# Patient Record
Sex: FEMALE | Race: WHITE | NOT HISPANIC OR LATINO | Employment: OTHER | URBAN - METROPOLITAN AREA
[De-identification: names, ages, dates, MRNs, and addresses within clinical notes are randomized per-mention and may not be internally consistent; named-entity substitution may affect disease eponyms.]

---

## 2017-12-25 ENCOUNTER — APPOINTMENT (OUTPATIENT)
Dept: RADIOLOGY | Facility: MEDICAL CENTER | Age: 47
DRG: 087 | End: 2017-12-25
Attending: SURGERY
Payer: COMMERCIAL

## 2017-12-25 ENCOUNTER — APPOINTMENT (OUTPATIENT)
Dept: RADIOLOGY | Facility: MEDICAL CENTER | Age: 47
DRG: 087 | End: 2017-12-25
Attending: EMERGENCY MEDICINE
Payer: COMMERCIAL

## 2017-12-25 ENCOUNTER — HOSPITAL ENCOUNTER (INPATIENT)
Facility: MEDICAL CENTER | Age: 47
LOS: 2 days | DRG: 087 | End: 2017-12-27
Attending: EMERGENCY MEDICINE | Admitting: SURGERY
Payer: COMMERCIAL

## 2017-12-25 ENCOUNTER — RESOLUTE PROFESSIONAL BILLING HOSPITAL PROF FEE (OUTPATIENT)
Dept: HOSPITALIST | Facility: MEDICAL CENTER | Age: 47
End: 2017-12-25
Payer: COMMERCIAL

## 2017-12-25 ENCOUNTER — HOSPITAL ENCOUNTER (OUTPATIENT)
Dept: RADIOLOGY | Facility: MEDICAL CENTER | Age: 47
End: 2017-12-25

## 2017-12-25 DIAGNOSIS — T14.90XA TRAUMA: ICD-10-CM

## 2017-12-25 DIAGNOSIS — S06.5XAA SUBDURAL HEMATOMA (HCC): ICD-10-CM

## 2017-12-25 DIAGNOSIS — S06.6X0A SUBARACHNOID HEMORRHAGE FOLLOWING INJURY, NO LOSS OF CONSCIOUSNESS, INITIAL ENCOUNTER (HCC): ICD-10-CM

## 2017-12-25 PROBLEM — Z53.09 CONTRAINDICATION TO DEEP VEIN THROMBOSIS (DVT) PROPHYLAXIS: Status: ACTIVE | Noted: 2017-12-25

## 2017-12-25 PROBLEM — S05.01XA ABRASION OF RIGHT CORNEA: Status: ACTIVE | Noted: 2017-12-25

## 2017-12-25 PROBLEM — D69.59 PLATELET DYSFUNCTION DUE TO DRUGS: Status: ACTIVE | Noted: 2017-12-25

## 2017-12-25 PROBLEM — T50.905A PLATELET DYSFUNCTION DUE TO DRUGS: Status: ACTIVE | Noted: 2017-12-25

## 2017-12-25 LAB
ABO GROUP BLD: NORMAL
ALBUMIN SERPL BCP-MCNC: 3.8 G/DL (ref 3.2–4.9)
ALBUMIN/GLOB SERPL: 1.3 G/DL
ALP SERPL-CCNC: 40 U/L (ref 30–99)
ALT SERPL-CCNC: 15 U/L (ref 2–50)
ANION GAP SERPL CALC-SCNC: 9 MMOL/L (ref 0–11.9)
APTT PPP: 26.3 SEC (ref 24.7–36)
AST SERPL-CCNC: 22 U/L (ref 12–45)
BILIRUB SERPL-MCNC: 0.4 MG/DL (ref 0.1–1.5)
BLD GP AB SCN SERPL QL: NORMAL
BUN SERPL-MCNC: 11 MG/DL (ref 8–22)
CALCIUM SERPL-MCNC: 9.6 MG/DL (ref 8.5–10.5)
CFT BLD TEG: 5.2 MIN (ref 5–10)
CHLORIDE SERPL-SCNC: 110 MMOL/L (ref 96–112)
CLOT ANGLE BLD TEG: 66.2 DEGREES (ref 53–72)
CLOT LYSIS 30M P MA LENFR BLD TEG: 0 % (ref 0–8)
CO2 SERPL-SCNC: 23 MMOL/L (ref 20–33)
CREAT SERPL-MCNC: 0.86 MG/DL (ref 0.5–1.4)
CT.EXTRINSIC BLD ROTEM: 1.8 MIN (ref 1–3)
ERYTHROCYTE [DISTWIDTH] IN BLOOD BY AUTOMATED COUNT: 38.4 FL (ref 35.9–50)
ETHANOL BLD-MCNC: 0 G/DL
GFR SERPL CREATININE-BSD FRML MDRD: >60 ML/MIN/1.73 M 2
GLOBULIN SER CALC-MCNC: 2.9 G/DL (ref 1.9–3.5)
GLUCOSE SERPL-MCNC: 83 MG/DL (ref 65–99)
HCG SERPL QL: NEGATIVE
HCT VFR BLD AUTO: 39.1 % (ref 37–47)
HGB BLD-MCNC: 13.3 G/DL (ref 12–16)
INR PPP: 1.19 (ref 0.87–1.13)
MCF BLD TEG: 66 MM (ref 50–70)
MCH RBC QN AUTO: 31 PG (ref 27–33)
MCHC RBC AUTO-ENTMCNC: 34 G/DL (ref 33.6–35)
MCV RBC AUTO: 91.1 FL (ref 81.4–97.8)
PA AA BLD-ACNC: 83 %
PA ADP BLD-ACNC: 13.4 %
PLATELET # BLD AUTO: 195 K/UL (ref 164–446)
PMV BLD AUTO: 10.9 FL (ref 9–12.9)
POTASSIUM SERPL-SCNC: 3.8 MMOL/L (ref 3.6–5.5)
PROT SERPL-MCNC: 6.7 G/DL (ref 6–8.2)
PROTHROMBIN TIME: 14.8 SEC (ref 12–14.6)
RBC # BLD AUTO: 4.29 M/UL (ref 4.2–5.4)
RH BLD: NORMAL
SODIUM SERPL-SCNC: 142 MMOL/L (ref 135–145)
TEG ALGORITHM TGALG: NORMAL
WBC # BLD AUTO: 7.9 K/UL (ref 4.8–10.8)

## 2017-12-25 PROCEDURE — 86900 BLOOD TYPING SEROLOGIC ABO: CPT

## 2017-12-25 PROCEDURE — 85576 BLOOD PLATELET AGGREGATION: CPT

## 2017-12-25 PROCEDURE — 86901 BLOOD TYPING SEROLOGIC RH(D): CPT

## 2017-12-25 PROCEDURE — 85610 PROTHROMBIN TIME: CPT

## 2017-12-25 PROCEDURE — 90471 IMMUNIZATION ADMIN: CPT

## 2017-12-25 PROCEDURE — A9270 NON-COVERED ITEM OR SERVICE: HCPCS | Performed by: SURGERY

## 2017-12-25 PROCEDURE — 3E0234Z INTRODUCTION OF SERUM, TOXOID AND VACCINE INTO MUSCLE, PERCUTANEOUS APPROACH: ICD-10-PCS | Performed by: SURGERY

## 2017-12-25 PROCEDURE — G0390 TRAUMA RESPONS W/HOSP CRITI: HCPCS

## 2017-12-25 PROCEDURE — 90686 IIV4 VACC NO PRSV 0.5 ML IM: CPT | Performed by: SURGERY

## 2017-12-25 PROCEDURE — 85730 THROMBOPLASTIN TIME PARTIAL: CPT

## 2017-12-25 PROCEDURE — 700101 HCHG RX REV CODE 250: Performed by: SURGERY

## 2017-12-25 PROCEDURE — 700105 HCHG RX REV CODE 258: Performed by: SURGERY

## 2017-12-25 PROCEDURE — 70450 CT HEAD/BRAIN W/O DYE: CPT

## 2017-12-25 PROCEDURE — 85384 FIBRINOGEN ACTIVITY: CPT

## 2017-12-25 PROCEDURE — 84703 CHORIONIC GONADOTROPIN ASSAY: CPT

## 2017-12-25 PROCEDURE — 85347 COAGULATION TIME ACTIVATED: CPT

## 2017-12-25 PROCEDURE — 99291 CRITICAL CARE FIRST HOUR: CPT

## 2017-12-25 PROCEDURE — 700102 HCHG RX REV CODE 250 W/ 637 OVERRIDE(OP): Performed by: SURGERY

## 2017-12-25 PROCEDURE — 86850 RBC ANTIBODY SCREEN: CPT

## 2017-12-25 PROCEDURE — 770022 HCHG ROOM/CARE - ICU (200)

## 2017-12-25 PROCEDURE — 85027 COMPLETE CBC AUTOMATED: CPT

## 2017-12-25 PROCEDURE — 80053 COMPREHEN METABOLIC PANEL: CPT

## 2017-12-25 PROCEDURE — 700111 HCHG RX REV CODE 636 W/ 250 OVERRIDE (IP): Performed by: SURGERY

## 2017-12-25 PROCEDURE — 80307 DRUG TEST PRSMV CHEM ANLYZR: CPT

## 2017-12-25 RX ORDER — POLYETHYLENE GLYCOL 3350 17 G/17G
1 POWDER, FOR SOLUTION ORAL 2 TIMES DAILY
Status: DISCONTINUED | OUTPATIENT
Start: 2017-12-25 | End: 2017-12-27 | Stop reason: HOSPADM

## 2017-12-25 RX ORDER — OXYCODONE HYDROCHLORIDE 5 MG/1
5 TABLET ORAL
Status: DISCONTINUED | OUTPATIENT
Start: 2017-12-25 | End: 2017-12-27

## 2017-12-25 RX ORDER — FAMOTIDINE 20 MG/1
20 TABLET, FILM COATED ORAL 2 TIMES DAILY
Status: DISCONTINUED | OUTPATIENT
Start: 2017-12-25 | End: 2017-12-26

## 2017-12-25 RX ORDER — DOCUSATE SODIUM 100 MG/1
100 CAPSULE, LIQUID FILLED ORAL 2 TIMES DAILY
Status: DISCONTINUED | OUTPATIENT
Start: 2017-12-25 | End: 2017-12-27 | Stop reason: HOSPADM

## 2017-12-25 RX ORDER — ACETAMINOPHEN 500 MG
1000 TABLET ORAL EVERY 6 HOURS
Status: DISCONTINUED | OUTPATIENT
Start: 2017-12-26 | End: 2017-12-27 | Stop reason: HOSPADM

## 2017-12-25 RX ORDER — ENEMA 19; 7 G/133ML; G/133ML
1 ENEMA RECTAL
Status: DISCONTINUED | OUTPATIENT
Start: 2017-12-25 | End: 2017-12-27 | Stop reason: HOSPADM

## 2017-12-25 RX ORDER — BISACODYL 10 MG
10 SUPPOSITORY, RECTAL RECTAL
Status: DISCONTINUED | OUTPATIENT
Start: 2017-12-25 | End: 2017-12-27 | Stop reason: HOSPADM

## 2017-12-25 RX ORDER — TOBRAMYCIN 3 MG/ML
2 SOLUTION/ DROPS OPHTHALMIC EVERY 4 HOURS
Status: DISCONTINUED | OUTPATIENT
Start: 2017-12-25 | End: 2017-12-27 | Stop reason: HOSPADM

## 2017-12-25 RX ORDER — ONDANSETRON 2 MG/ML
4 INJECTION INTRAMUSCULAR; INTRAVENOUS EVERY 4 HOURS PRN
Status: DISCONTINUED | OUTPATIENT
Start: 2017-12-25 | End: 2017-12-27 | Stop reason: HOSPADM

## 2017-12-25 RX ORDER — AMOXICILLIN 250 MG
1 CAPSULE ORAL
Status: DISCONTINUED | OUTPATIENT
Start: 2017-12-25 | End: 2017-12-27 | Stop reason: HOSPADM

## 2017-12-25 RX ORDER — AMOXICILLIN 250 MG
1 CAPSULE ORAL NIGHTLY
Status: DISCONTINUED | OUTPATIENT
Start: 2017-12-25 | End: 2017-12-27 | Stop reason: HOSPADM

## 2017-12-25 RX ADMIN — FENTANYL CITRATE 50 MCG: 50 INJECTION INTRAMUSCULAR; INTRAVENOUS at 22:56

## 2017-12-25 RX ADMIN — STANDARDIZED SENNA CONCENTRATE AND DOCUSATE SODIUM 1 TABLET: 8.6; 5 TABLET, FILM COATED ORAL at 21:40

## 2017-12-25 RX ADMIN — ONDANSETRON 4 MG: 2 INJECTION INTRAMUSCULAR; INTRAVENOUS at 21:44

## 2017-12-25 RX ADMIN — TOBRAMYCIN 2 DROP: 3 SOLUTION/ DROPS OPHTHALMIC at 21:38

## 2017-12-25 RX ADMIN — FAMOTIDINE 20 MG: 20 TABLET, FILM COATED ORAL at 21:39

## 2017-12-25 RX ADMIN — FENTANYL CITRATE 50 MCG: 50 INJECTION INTRAMUSCULAR; INTRAVENOUS at 20:32

## 2017-12-25 RX ADMIN — INFLUENZA A VIRUS A/MICHIGAN/45/2015 X-275 (H1N1) ANTIGEN (FORMALDEHYDE INACTIVATED), INFLUENZA A VIRUS A/HONG KONG/4801/2014 X-263B (H3N2) ANTIGEN (FORMALDEHYDE INACTIVATED), INFLUENZA B VIRUS B/PHUKET/3073/2013 ANTIGEN (FORMALDEHYDE INACTIVATED), AND INFLUENZA B VIRUS B/BRISBANE/60/2008 ANTIGEN (FORMALDEHYDE INACTIVATED) 0.5 ML: 15; 15; 15; 15 INJECTION, SUSPENSION INTRAMUSCULAR at 21:55

## 2017-12-25 RX ADMIN — DEXTROSE MONOHYDRATE 500 MG: 50 INJECTION, SOLUTION INTRAVENOUS at 21:38

## 2017-12-25 RX ADMIN — OXYCODONE HYDROCHLORIDE 5 MG: 5 TABLET ORAL at 21:38

## 2017-12-25 ASSESSMENT — LIFESTYLE VARIABLES
ALCOHOL_USE: NO
EVER_SMOKED: NEVER

## 2017-12-25 ASSESSMENT — PAIN SCALES - GENERAL
PAINLEVEL_OUTOF10: 8
PAINLEVEL_OUTOF10: 6
PAINLEVEL_OUTOF10: 8
PAINLEVEL_OUTOF10: 8
PAINLEVEL_OUTOF10: 7

## 2017-12-25 ASSESSMENT — PATIENT HEALTH QUESTIONNAIRE - PHQ9
1. LITTLE INTEREST OR PLEASURE IN DOING THINGS: NOT AT ALL
SUM OF ALL RESPONSES TO PHQ9 QUESTIONS 1 AND 2: 0
SUM OF ALL RESPONSES TO PHQ QUESTIONS 1-9: 0
2. FEELING DOWN, DEPRESSED, IRRITABLE, OR HOPELESS: NOT AT ALL

## 2017-12-26 ENCOUNTER — APPOINTMENT (OUTPATIENT)
Dept: RADIOLOGY | Facility: MEDICAL CENTER | Age: 47
DRG: 087 | End: 2017-12-26
Attending: SURGERY
Payer: COMMERCIAL

## 2017-12-26 LAB
ABO GROUP BLD: NORMAL
ALBUMIN SERPL BCP-MCNC: 3.6 G/DL (ref 3.2–4.9)
ALBUMIN/GLOB SERPL: 1.5 G/DL
ALP SERPL-CCNC: 36 U/L (ref 30–99)
ALT SERPL-CCNC: 13 U/L (ref 2–50)
ANION GAP SERPL CALC-SCNC: 5 MMOL/L (ref 0–11.9)
AST SERPL-CCNC: 18 U/L (ref 12–45)
BASOPHILS # BLD AUTO: 1.2 % (ref 0–1.8)
BASOPHILS # BLD: 0.06 K/UL (ref 0–0.12)
BILIRUB SERPL-MCNC: 0.5 MG/DL (ref 0.1–1.5)
BUN SERPL-MCNC: 10 MG/DL (ref 8–22)
CALCIUM SERPL-MCNC: 9 MG/DL (ref 8.5–10.5)
CHLORIDE SERPL-SCNC: 108 MMOL/L (ref 96–112)
CO2 SERPL-SCNC: 26 MMOL/L (ref 20–33)
CREAT SERPL-MCNC: 0.87 MG/DL (ref 0.5–1.4)
EOSINOPHIL # BLD AUTO: 0.15 K/UL (ref 0–0.51)
EOSINOPHIL NFR BLD: 3 % (ref 0–6.9)
ERYTHROCYTE [DISTWIDTH] IN BLOOD BY AUTOMATED COUNT: 40.4 FL (ref 35.9–50)
GFR SERPL CREATININE-BSD FRML MDRD: >60 ML/MIN/1.73 M 2
GLOBULIN SER CALC-MCNC: 2.4 G/DL (ref 1.9–3.5)
GLUCOSE SERPL-MCNC: 83 MG/DL (ref 65–99)
HCT VFR BLD AUTO: 37.1 % (ref 37–47)
HGB BLD-MCNC: 12.4 G/DL (ref 12–16)
IMM GRANULOCYTES # BLD AUTO: 0.01 K/UL (ref 0–0.11)
IMM GRANULOCYTES NFR BLD AUTO: 0.2 % (ref 0–0.9)
LYMPHOCYTES # BLD AUTO: 1.69 K/UL (ref 1–4.8)
LYMPHOCYTES NFR BLD: 34.2 % (ref 22–41)
MCH RBC QN AUTO: 30.8 PG (ref 27–33)
MCHC RBC AUTO-ENTMCNC: 33.4 G/DL (ref 33.6–35)
MCV RBC AUTO: 92.1 FL (ref 81.4–97.8)
MONOCYTES # BLD AUTO: 0.41 K/UL (ref 0–0.85)
MONOCYTES NFR BLD AUTO: 8.3 % (ref 0–13.4)
NEUTROPHILS # BLD AUTO: 2.62 K/UL (ref 2–7.15)
NEUTROPHILS NFR BLD: 53.1 % (ref 44–72)
NRBC # BLD AUTO: 0 K/UL
NRBC BLD-RTO: 0 /100 WBC
PLATELET # BLD AUTO: 153 K/UL (ref 164–446)
PMV BLD AUTO: 10.9 FL (ref 9–12.9)
POTASSIUM SERPL-SCNC: 4 MMOL/L (ref 3.6–5.5)
PROT SERPL-MCNC: 6 G/DL (ref 6–8.2)
RBC # BLD AUTO: 4.03 M/UL (ref 4.2–5.4)
SODIUM SERPL-SCNC: 139 MMOL/L (ref 135–145)
WBC # BLD AUTO: 4.9 K/UL (ref 4.8–10.8)

## 2017-12-26 PROCEDURE — 302196 LINEN, HYPOALLERGENIC: Performed by: SURGERY

## 2017-12-26 PROCEDURE — 700105 HCHG RX REV CODE 258: Performed by: SURGERY

## 2017-12-26 PROCEDURE — 700111 HCHG RX REV CODE 636 W/ 250 OVERRIDE (IP): Performed by: SURGERY

## 2017-12-26 PROCEDURE — 80053 COMPREHEN METABOLIC PANEL: CPT

## 2017-12-26 PROCEDURE — 700111 HCHG RX REV CODE 636 W/ 250 OVERRIDE (IP): Performed by: NURSE PRACTITIONER

## 2017-12-26 PROCEDURE — 92523 SPEECH SOUND LANG COMPREHEN: CPT

## 2017-12-26 PROCEDURE — 85025 COMPLETE CBC W/AUTO DIFF WBC: CPT

## 2017-12-26 PROCEDURE — G9166 ATTEN GOAL STATUS: HCPCS | Mod: CI

## 2017-12-26 PROCEDURE — 700102 HCHG RX REV CODE 250 W/ 637 OVERRIDE(OP): Performed by: SURGERY

## 2017-12-26 PROCEDURE — G9167 ATTEN D/C STATUS: HCPCS | Mod: CI

## 2017-12-26 PROCEDURE — 99233 SBSQ HOSP IP/OBS HIGH 50: CPT | Performed by: SURGERY

## 2017-12-26 PROCEDURE — 700112 HCHG RX REV CODE 229: Performed by: SURGERY

## 2017-12-26 PROCEDURE — G9165 ATTEN CURRENT STATUS: HCPCS | Mod: CI

## 2017-12-26 PROCEDURE — 70450 CT HEAD/BRAIN W/O DYE: CPT

## 2017-12-26 PROCEDURE — A9270 NON-COVERED ITEM OR SERVICE: HCPCS | Performed by: SURGERY

## 2017-12-26 PROCEDURE — 700101 HCHG RX REV CODE 250: Performed by: SURGERY

## 2017-12-26 PROCEDURE — 770006 HCHG ROOM/CARE - MED/SURG/GYN SEMI*

## 2017-12-26 RX ORDER — DIAZEPAM 2 MG/1
2 TABLET ORAL EVERY 6 HOURS PRN
Status: DISCONTINUED | OUTPATIENT
Start: 2017-12-26 | End: 2017-12-27 | Stop reason: HOSPADM

## 2017-12-26 RX ADMIN — TOBRAMYCIN 2 DROP: 3 SOLUTION/ DROPS OPHTHALMIC at 05:05

## 2017-12-26 RX ADMIN — FAMOTIDINE 20 MG: 20 TABLET, FILM COATED ORAL at 08:34

## 2017-12-26 RX ADMIN — STANDARDIZED SENNA CONCENTRATE AND DOCUSATE SODIUM 1 TABLET: 8.6; 5 TABLET, FILM COATED ORAL at 20:12

## 2017-12-26 RX ADMIN — FENTANYL CITRATE 50 MCG: 50 INJECTION INTRAMUSCULAR; INTRAVENOUS at 01:20

## 2017-12-26 RX ADMIN — TOBRAMYCIN 2 DROP: 3 SOLUTION/ DROPS OPHTHALMIC at 20:57

## 2017-12-26 RX ADMIN — ACETAMINOPHEN 1000 MG: 500 TABLET ORAL at 23:54

## 2017-12-26 RX ADMIN — DOCUSATE SODIUM 100 MG: 100 CAPSULE ORAL at 08:34

## 2017-12-26 RX ADMIN — ACETAMINOPHEN 1000 MG: 500 TABLET ORAL at 01:20

## 2017-12-26 RX ADMIN — TOBRAMYCIN 2 DROP: 3 SOLUTION/ DROPS OPHTHALMIC at 01:22

## 2017-12-26 RX ADMIN — OXYCODONE HYDROCHLORIDE 5 MG: 5 TABLET ORAL at 08:35

## 2017-12-26 RX ADMIN — FENTANYL CITRATE 50 MCG: 50 INJECTION INTRAMUSCULAR; INTRAVENOUS at 14:40

## 2017-12-26 RX ADMIN — TOBRAMYCIN 2 DROP: 3 SOLUTION/ DROPS OPHTHALMIC at 14:00

## 2017-12-26 RX ADMIN — FENTANYL CITRATE 50 MCG: 50 INJECTION INTRAMUSCULAR; INTRAVENOUS at 08:34

## 2017-12-26 RX ADMIN — DOCUSATE SODIUM 100 MG: 100 CAPSULE ORAL at 20:13

## 2017-12-26 RX ADMIN — FENTANYL CITRATE 50 MCG: 50 INJECTION INTRAMUSCULAR; INTRAVENOUS at 11:59

## 2017-12-26 RX ADMIN — OXYCODONE HYDROCHLORIDE 5 MG: 5 TABLET ORAL at 20:13

## 2017-12-26 RX ADMIN — OXYCODONE HYDROCHLORIDE 5 MG: 5 TABLET ORAL at 23:54

## 2017-12-26 RX ADMIN — OXYCODONE HYDROCHLORIDE 5 MG: 5 TABLET ORAL at 14:39

## 2017-12-26 RX ADMIN — ACETAMINOPHEN 1000 MG: 500 TABLET ORAL at 05:05

## 2017-12-26 RX ADMIN — TOBRAMYCIN 2 DROP: 3 SOLUTION/ DROPS OPHTHALMIC at 10:00

## 2017-12-26 RX ADMIN — OXYCODONE HYDROCHLORIDE 5 MG: 5 TABLET ORAL at 11:59

## 2017-12-26 RX ADMIN — FENTANYL CITRATE 25 MCG: 50 INJECTION, SOLUTION INTRAMUSCULAR; INTRAVENOUS at 20:31

## 2017-12-26 RX ADMIN — DEXTROSE MONOHYDRATE 500 MG: 50 INJECTION, SOLUTION INTRAVENOUS at 20:31

## 2017-12-26 ASSESSMENT — COPD QUESTIONNAIRES
HAVE YOU SMOKED AT LEAST 100 CIGARETTES IN YOUR ENTIRE LIFE: NO/DON'T KNOW
DURING THE PAST 4 WEEKS HOW MUCH DID YOU FEEL SHORT OF BREATH: NONE/LITTLE OF THE TIME
COPD SCREENING SCORE: 0
DO YOU EVER COUGH UP ANY MUCUS OR PHLEGM?: NO/ONLY WITH OCCASIONAL COLDS OR INFECTIONS

## 2017-12-26 ASSESSMENT — PAIN SCALES - GENERAL
PAINLEVEL_OUTOF10: 4
PAINLEVEL_OUTOF10: 2
PAINLEVEL_OUTOF10: 8
PAINLEVEL_OUTOF10: 7
PAINLEVEL_OUTOF10: 7
PAINLEVEL_OUTOF10: 8
PAINLEVEL_OUTOF10: 5
PAINLEVEL_OUTOF10: 8
PAINLEVEL_OUTOF10: 6
PAINLEVEL_OUTOF10: 4

## 2017-12-26 ASSESSMENT — PATIENT HEALTH QUESTIONNAIRE - PHQ9
SUM OF ALL RESPONSES TO PHQ QUESTIONS 1-9: 0
1. LITTLE INTEREST OR PLEASURE IN DOING THINGS: NOT AT ALL
SUM OF ALL RESPONSES TO PHQ9 QUESTIONS 1 AND 2: 0
2. FEELING DOWN, DEPRESSED, IRRITABLE, OR HOPELESS: NOT AT ALL

## 2017-12-26 ASSESSMENT — ENCOUNTER SYMPTOMS
ABDOMINAL PAIN: 0
NECK PAIN: 1
NAUSEA: 0
BLURRED VISION: 1
HEADACHES: 1
FEVER: 0
CHILLS: 0
FOCAL WEAKNESS: 0
BACK PAIN: 1
SHORTNESS OF BREATH: 0
SENSORY CHANGE: 0
MYALGIAS: 1
SPEECH CHANGE: 0
VOMITING: 0

## 2017-12-26 ASSESSMENT — LIFESTYLE VARIABLES: EVER_SMOKED: NEVER

## 2017-12-26 NOTE — CARE PLAN
Problem: Infection  Goal: Will remain free from infection  Outcome: PROGRESSING AS EXPECTED  Hand washing promoted.
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
## 2017-12-26 NOTE — THERAPY
"Speech Language Therapy Evaluation completed to address cognition.    Functional Status: Pt AAOx4 with daughter and sister beside, and engaging in complex conversation.  Family reporting pt appears to be WNL cognitively.  Pt reporting severe pain and \"emotional stress\", however does not feel reporting confusion or memory loss.  Cognitive testing revealed pt has minimal deficits with attention, however she is aware of this and self corrects and requests repetition of information.  Suspect pain and medication could be contributing to mild attention deficits.  Pt has a patch on her right eye 2/2 corneal abrasion, and was without her glasses.  She also had minimal deficits following complex written directions, however again was able to self correct.  All other domains were within functional limits.  She had good short term memory and good verbal problem solving skills.  Pt was educated regarding s/sx of TBI/post-concussion syndrome and possible s/sx to be aware of post discharge.      Recommendations/Plan of Care: 1) Patient with no further skilled SLP needs in the acute care setting at this time, 2) If she finds that return to her regular routine is difficult, she may benefit from a post acute SLP evaluation    Post-Acute Therapy: Currently anticipate no further skilled therapy needs once patient is discharged from the inpatient setting.    See \"Rehab Therapy-Acute\" Patient Summary Report for complete documentation.  Thank you for the consult.  "
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
## 2017-12-26 NOTE — CARE PLAN
Problem: Safety  Goal: Will remain free from injury  Outcome: PROGRESSING AS EXPECTED  Educated pt about necessity of remaining in bed and reoriented frequently to environment and situation. Side rails raised x2, bed in lowest position, bed alarm functioning and in use, call light within reach. Pt free from falls/injury.    Problem: Pain Management  Goal: Pain level will decrease to patient's comfort goal  Outcome: PROGRESSING AS EXPECTED  Pt c/o headache and R eye pain. Medicated per MAR, provided education & emotional support, quiet area, decreased lighting, and nutrition. Pt able to rest.

## 2017-12-26 NOTE — CARE PLAN
Problem: Safety  Goal: Will remain free from falls  Outcome: PROGRESSING AS EXPECTED  Call light in reach, bed alarm set, oriented to call when needing to get out of bed.

## 2017-12-26 NOTE — PROGRESS NOTES
Assumed care of pt @ 1955.  Bedside report received from CASTILLO Johnson.  Pt transferred from [place] [id number] to [facility] with monitor in use and ambu bag at bedside.
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
## 2017-12-27 VITALS
SYSTOLIC BLOOD PRESSURE: 104 MMHG | WEIGHT: 103.4 LBS | RESPIRATION RATE: 16 BRPM | DIASTOLIC BLOOD PRESSURE: 69 MMHG | OXYGEN SATURATION: 95 % | HEIGHT: 64 IN | HEART RATE: 73 BPM | BODY MASS INDEX: 17.65 KG/M2 | TEMPERATURE: 97 F

## 2017-12-27 LAB
BASOPHILS # BLD AUTO: 1.6 % (ref 0–1.8)
BASOPHILS # BLD: 0.06 K/UL (ref 0–0.12)
EOSINOPHIL # BLD AUTO: 0.29 K/UL (ref 0–0.51)
EOSINOPHIL NFR BLD: 7.7 % (ref 0–6.9)
ERYTHROCYTE [DISTWIDTH] IN BLOOD BY AUTOMATED COUNT: 39.7 FL (ref 35.9–50)
HCT VFR BLD AUTO: 36.7 % (ref 37–47)
HGB BLD-MCNC: 12.2 G/DL (ref 12–16)
IMM GRANULOCYTES # BLD AUTO: 0 K/UL (ref 0–0.11)
IMM GRANULOCYTES NFR BLD AUTO: 0 % (ref 0–0.9)
LYMPHOCYTES # BLD AUTO: 1.71 K/UL (ref 1–4.8)
LYMPHOCYTES NFR BLD: 45.2 % (ref 22–41)
MCH RBC QN AUTO: 31 PG (ref 27–33)
MCHC RBC AUTO-ENTMCNC: 33.2 G/DL (ref 33.6–35)
MCV RBC AUTO: 93.1 FL (ref 81.4–97.8)
MONOCYTES # BLD AUTO: 0.32 K/UL (ref 0–0.85)
MONOCYTES NFR BLD AUTO: 8.5 % (ref 0–13.4)
NEUTROPHILS # BLD AUTO: 1.4 K/UL (ref 2–7.15)
NEUTROPHILS NFR BLD: 37 % (ref 44–72)
NRBC # BLD AUTO: 0 K/UL
NRBC BLD-RTO: 0 /100 WBC
PLATELET # BLD AUTO: 181 K/UL (ref 164–446)
PMV BLD AUTO: 11.4 FL (ref 9–12.9)
RBC # BLD AUTO: 3.94 M/UL (ref 4.2–5.4)
WBC # BLD AUTO: 3.8 K/UL (ref 4.8–10.8)

## 2017-12-27 PROCEDURE — 700102 HCHG RX REV CODE 250 W/ 637 OVERRIDE(OP): Performed by: SURGERY

## 2017-12-27 PROCEDURE — A9270 NON-COVERED ITEM OR SERVICE: HCPCS | Performed by: SURGERY

## 2017-12-27 PROCEDURE — G8987 SELF CARE CURRENT STATUS: HCPCS | Mod: CI

## 2017-12-27 PROCEDURE — A9270 NON-COVERED ITEM OR SERVICE: HCPCS | Performed by: NURSE PRACTITIONER

## 2017-12-27 PROCEDURE — 700102 HCHG RX REV CODE 250 W/ 637 OVERRIDE(OP): Performed by: NURSE PRACTITIONER

## 2017-12-27 PROCEDURE — 97535 SELF CARE MNGMENT TRAINING: CPT

## 2017-12-27 PROCEDURE — G8979 MOBILITY GOAL STATUS: HCPCS | Mod: CI

## 2017-12-27 PROCEDURE — 97165 OT EVAL LOW COMPLEX 30 MIN: CPT

## 2017-12-27 PROCEDURE — 700112 HCHG RX REV CODE 229: Performed by: SURGERY

## 2017-12-27 PROCEDURE — 700111 HCHG RX REV CODE 636 W/ 250 OVERRIDE (IP): Performed by: NURSE PRACTITIONER

## 2017-12-27 PROCEDURE — 85025 COMPLETE CBC W/AUTO DIFF WBC: CPT

## 2017-12-27 PROCEDURE — 700111 HCHG RX REV CODE 636 W/ 250 OVERRIDE (IP): Performed by: SURGERY

## 2017-12-27 PROCEDURE — G8988 SELF CARE GOAL STATUS: HCPCS | Mod: CI

## 2017-12-27 PROCEDURE — 700105 HCHG RX REV CODE 258: Performed by: SURGERY

## 2017-12-27 PROCEDURE — 36415 COLL VENOUS BLD VENIPUNCTURE: CPT

## 2017-12-27 PROCEDURE — 97161 PT EVAL LOW COMPLEX 20 MIN: CPT

## 2017-12-27 PROCEDURE — G8978 MOBILITY CURRENT STATUS: HCPCS | Mod: CJ

## 2017-12-27 RX ORDER — OXYCODONE HYDROCHLORIDE 10 MG/1
10 TABLET ORAL
Qty: 28 TAB | Refills: 0 | Status: SHIPPED | OUTPATIENT
Start: 2017-12-27 | End: 2018-01-03

## 2017-12-27 RX ORDER — TOBRAMYCIN 3 MG/ML
2 SOLUTION/ DROPS OPHTHALMIC EVERY 4 HOURS
Qty: 3 ML | Refills: 0 | Status: SHIPPED | OUTPATIENT
Start: 2017-12-27 | End: 2018-01-01

## 2017-12-27 RX ORDER — OXYCODONE HYDROCHLORIDE 10 MG/1
10 TABLET ORAL
Status: DISCONTINUED | OUTPATIENT
Start: 2017-12-27 | End: 2017-12-27 | Stop reason: HOSPADM

## 2017-12-27 RX ORDER — ONDANSETRON 4 MG/1
4 TABLET, ORALLY DISINTEGRATING ORAL EVERY 4 HOURS PRN
Status: DISCONTINUED | OUTPATIENT
Start: 2017-12-27 | End: 2017-12-27 | Stop reason: HOSPADM

## 2017-12-27 RX ORDER — LEVETIRACETAM 500 MG/1
500 TABLET ORAL 2 TIMES DAILY
Qty: 6 TAB | Refills: 0 | Status: SHIPPED | OUTPATIENT
Start: 2017-12-27 | End: 2017-12-30

## 2017-12-27 RX ORDER — DIAZEPAM 2 MG/1
1 TABLET ORAL EVERY 6 HOURS PRN
Qty: 15 TAB | Refills: 0 | Status: SHIPPED | OUTPATIENT
Start: 2017-12-27 | End: 2018-01-03

## 2017-12-27 RX ADMIN — DOCUSATE SODIUM 100 MG: 100 CAPSULE ORAL at 07:58

## 2017-12-27 RX ADMIN — OXYCODONE HYDROCHLORIDE 10 MG: 10 TABLET ORAL at 13:42

## 2017-12-27 RX ADMIN — TOBRAMYCIN 2 DROP: 3 SOLUTION/ DROPS OPHTHALMIC at 10:00

## 2017-12-27 RX ADMIN — ONDANSETRON 4 MG: 4 TABLET, ORALLY DISINTEGRATING ORAL at 14:18

## 2017-12-27 RX ADMIN — OXYCODONE HYDROCHLORIDE 5 MG: 5 TABLET ORAL at 07:58

## 2017-12-27 RX ADMIN — MAGNESIUM HYDROXIDE 30 ML: 400 SUSPENSION ORAL at 07:58

## 2017-12-27 RX ADMIN — DEXTROSE MONOHYDRATE 500 MG: 50 INJECTION, SOLUTION INTRAVENOUS at 07:58

## 2017-12-27 RX ADMIN — DIAZEPAM 2 MG: 2 TABLET ORAL at 04:00

## 2017-12-27 RX ADMIN — OXYCODONE HYDROCHLORIDE 10 MG: 10 TABLET ORAL at 10:34

## 2017-12-27 RX ADMIN — ONDANSETRON 4 MG: 2 INJECTION INTRAMUSCULAR; INTRAVENOUS at 12:29

## 2017-12-27 RX ADMIN — ACETAMINOPHEN 1000 MG: 500 TABLET ORAL at 12:10

## 2017-12-27 RX ADMIN — OXYCODONE HYDROCHLORIDE 5 MG: 5 TABLET ORAL at 04:00

## 2017-12-27 RX ADMIN — TOBRAMYCIN 2 DROP: 3 SOLUTION/ DROPS OPHTHALMIC at 04:04

## 2017-12-27 ASSESSMENT — COGNITIVE AND FUNCTIONAL STATUS - GENERAL
WALKING IN HOSPITAL ROOM: A LITTLE
STANDING UP FROM CHAIR USING ARMS: A LITTLE
SUGGESTED CMS G CODE MODIFIER MOBILITY: CJ
CLIMB 3 TO 5 STEPS WITH RAILING: A LITTLE
HELP NEEDED FOR BATHING: A LITTLE
SUGGESTED CMS G CODE MODIFIER DAILY ACTIVITY: CI
MOVING FROM LYING ON BACK TO SITTING ON SIDE OF FLAT BED: A LITTLE
MOBILITY SCORE: 20
DAILY ACTIVITIY SCORE: 23

## 2017-12-27 ASSESSMENT — PAIN SCALES - GENERAL
PAINLEVEL_OUTOF10: 4
PAINLEVEL_OUTOF10: 7
PAINLEVEL_OUTOF10: ASSUMED PAIN PRESENT
PAINLEVEL_OUTOF10: 0
PAINLEVEL_OUTOF10: 6

## 2017-12-27 ASSESSMENT — ACTIVITIES OF DAILY LIVING (ADL): TOILETING: INDEPENDENT

## 2017-12-27 ASSESSMENT — GAIT ASSESSMENTS
DISTANCE (FEET): 150
ASSISTIVE DEVICE: FRONT WHEEL WALKER
DEVIATION: BRADYKINETIC;SHUFFLED GAIT
GAIT LEVEL OF ASSIST: STAND BY ASSIST

## 2017-12-27 NOTE — CARE PLAN
Problem: Communication  Goal: The ability to communicate needs accurately and effectively will improve    Intervention: [place] patient and significant other/support system to call light to alert staff of needs  Able to make needs known, instructed to call for assistance      Problem: Pain Management  Goal: Pain level will decrease to patient's comfort goal    Intervention: Follow pain managment plan developed in collaboration with patient and Interdisciplinary Team  Complains of right sided pain, increased Oxy to 10mg, working on pain control

## 2017-12-27 NOTE — CARE PLAN
Problem: Pain Management  Goal: Pain level will decrease to patient's comfort goal  Outcome: PROGRESSING AS EXPECTED  Patient requesting oxycodone as well as IV fentanyl for pain control. Tylenol given on schedule every 6 hours. Also using hot pack for neck/shoulder/back muscle soreness.    Problem: Mobility  Goal: Risk for activity intolerance will decrease  Outcome: PROGRESSING SLOWER THAN EXPECTED  Patient ambulatory to bathroom and in room with steady gait. Patch over right eye. Does report some dizziness with when standing up.
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
## 2017-12-27 NOTE — PROGRESS NOTES
Assumed care of pt. Able to make needs known. A/ox4. New PIV placed in right forearm 20g, saline locked and flushing well. Regular diet. Up self with some dizziness. Family at bedside. OT recommending FWW for DC. POC home today maybe. Call light in reach, bed in low position, will continue hourly rounding.
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
## 2017-12-27 NOTE — DISCHARGE PLANNING
Medical Social Worker    KI got DME FWW CHOICE for [facility]. KI faxed CHOICE form to [facility].

## 2017-12-27 NOTE — PROGRESS NOTES
Drea Chueng Fall Risk Assessment:     Last Known Fall: No falls  Mobility: Dizziness/generalized weakness  Medications: Cardiovascular or central nervous system meds  Mental Status/LOC/Awareness: Awake, alert, and oriented to date, place, and person  Toileting Needs: No needs  Volume/Electrolyte Status: No problems  Communication/Sensory: Visual (Glasses)/hearing deficit  Behavior: Appropriate behavior  Drea Cheung Fall Risk Total: 5  Fall Risk Level: NO RISK    Universal Fall Precautions:  call light/belongings in reach, bed in low position and locked, wheelchairs and assistive devices out of sight, siderails up x 2, use non-slip footwear, adequate lighting, clutter free and spill free environment, educate on level of risk, educate to call for assistance    Fall Risk Level Interventions:          Patient Specific Interventions:     Medication: review medications with patient and family and limit combination of prn medications  Mental Status/LOC/Awareness: reinforce falls education, check on patient hourly, utilize bed/chair fall alarm and reinforce the use of call light  Toileting: monitor intake and output/use of appropriate interventions, instruct patient/family on the use of grab bars, instruct patient/family on the need to call for assistance when toileting, do not leave patient unattended in bathroom/refer to toileting scripting and toilet prior to giving pain medications  Volume/Electrolyte Status: ensure patient remains hydrated and teach patients to dangle before rising if hypotensive  Communication/Sensory: update plan of care on whiteboard and ensure proper positioning when transferrng/ambulating  Behavioral: administer medication as ordered and instruct/reinforce fall program rationale  Mobility: dangle prior to standing, utilize bed/chair fall alarm, ensure bed is locked and in lowest position and provide appropriate assistive device
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
## 2017-12-27 NOTE — PROGRESS NOTES
Pt given prescriptions and discharge paperwork. PIV removed. All questions answered. Pt taken down by WC to  to wait for ride; waiting with partner.

## 2017-12-27 NOTE — PROGRESS NOTES
Patient pain controlled with Oxycodone given about every 4 hours, Valium given once, Fentanyl given once and scheduled Tylenol. Up to bathroom with standby assist, gait steady, reports dizziness improving. Patch in place to right eye. Reports sleeping well tonight in between care.
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
## 2017-12-27 NOTE — PROGRESS NOTES
Drea Cheung Fall Risk Assessment:     Last Known Fall: No falls  Mobility: Dizziness/generalized weakness  Medications: Cardiovascular or central nervous system meds  Mental Status/LOC/Awareness: Awake, alert, and oriented to date, place, and person  Toileting Needs: No needs  Volume/Electrolyte Status: No problems  Communication/Sensory: Visual (Glasses)/hearing deficit  Behavior: Appropriate behavior  Drea Cheung Fall Risk Total: 5  Fall Risk Level: NO RISK    Universal Fall Precautions:  call light/belongings in reach, bed in low position and locked, wheelchairs and assistive devices out of sight, siderails up x 2, use non-slip footwear, adequate lighting, clutter free and spill free environment, educate on level of risk, educate to call for assistance    Fall Risk Level Interventions:          Patient Specific Interventions:     Medication: review medications with patient and family  Mental Status/LOC/Awareness: reinforce falls education, encourage family to stay with patient and reinforce the use of call light  Toileting: provide frquent toileting  Volume/Electrolyte Status: ensure patient remains hydrated  Communication/Sensory: update plan of care on whiteboard  Behavioral: engage patient in daily activities  Mobility: ensure bed is locked and in lowest position

## 2017-12-28 ENCOUNTER — PATIENT OUTREACH (OUTPATIENT)
Dept: HEALTH INFORMATION MANAGEMENT | Facility: OTHER | Age: 47
End: 2017-12-28

## 2017-12-28 NOTE — PROGRESS NOTES
Placed discharge outreach phone call to patient s/p hospital discharge 12/27/17.  Left voicemail providing my contact information and instructions to call with any questions or concerns.

## 2017-12-29 NOTE — THERAPY
"Occupational Therapy Evaluation completed.   Functional Status:  Initially up in bathroom, friend assisted pt BTB, c/o dizziness, spv w/bed mobility, c/o 8/10 h/a c/o irritability, and feeling overly emotional. Fair strength and normal coordination w/UE, walked to bathroom w/fww balance improved. Spv w/grooming standing, continued c/o pain and fatigue, educated on TBI recovery and modifications w/sensory over load. Spv w/txf to chair remained up w/family present RN aware   Plan of Care: Will benefit from Occupational Therapy 2 times per week  Discharge Recommendations:  Equipment: Will Continue to Assess for Equipment Needs. Post-acute therapy Currently anticipate no further skilled therapy needs once patient is discharged from the inpatient setting.    See \"Rehab Therapy-Acute\" Patient Summary Report for complete documentation.    " [facility]  Pulmonology  Progress Note    Patient Name: Yen Falcon  MRN: [id number]  Admission Date: 12/26/2017  Hospital Length of Stay: 3 days  Code Status: Full Code  Attending Provider: Brian He MD  Primary Care Provider: Lauren Goodman MD   Principal Problem: Admission for chest tube placement    Subjective     Interval history  No acute events. Transferred to floor. Remains on HFNC. Main complaint is of epigastric pain and sensation of fullness.      Objective     Vitals:    12/29/17 1133   BP:    Pulse: (!) 117   Resp: 18   Temp:        Physical Exam   Constitutional: She is oriented to person, place, and time. She appears well-developed.   HENT: Head: Normocephalic and atraumatic. Right pre-auricular node   Neck: Normal range of motion. Neck supple.   Cardiovascular: Normal rate and regular rhythm.    Pulmonary/Chest: Decreased breath sounds on the left with rhonchi and wheezes. Wheezes and crackles in right base.   Abdominal: Soft. BS robust. Mild TTP over epigastrium. No rebound tenderness.   Musculoskeletal: Normal range of motion.   Neurological: She is alert and oriented to person, place, and time.   Skin: Skin is warm and dry.   Psychiatric: She has a normal mood and affect.     Labs    Recent Labs  Lab 12/23/17  0648 12/26/17  1310 12/27/17  0747   WBC 22.94* 22.70* 21.98*   HGB 10.8* 12.2 11.8*   HCT 33.8* 37.4 37.1   * 425* 404*         Recent Labs  Lab 12/26/17  1310 12/27/17  0747 12/29/17  0426   * 133* 135*   K 4.1 4.5 4.4    102 101   CO2 24 22* 26   BUN 15 15 12   CREATININE 0.6 0.6 0.6   CALCIUM 8.4* 8.0* 8.1*   PROT 6.0  --   --    BILITOT 0.3  --   --    ALKPHOS 131  --   --    *  --   --    AST 63*  --   --          Assessment/Recommendations                Adenocarcinoma of left lung, stage 4     · Left lung hilar mass, widely metastatic including brain, chest, abdomen  · Right axillary LN biopsied at [facility] on 12/18    · Axillary node showed adenocarcinoma7  · Chemotherapy started per Dr. Quiros on 12/27, well tolerated thus far       Bilateral pneumonia     · Post-obstructive pneumonia from tumor burden  · Not toxic, finishing a moxi course       Acute hypoxemic respiratory failure     · Requiring CF/HFNC to maintain sats and comfort  · Wean supplemental O2 as tolerated  · Arrange for home O2       Pleural effusions, bilateral     · Secondary to malignancy  · thoracentesis 12/22/17 1L removed   · Thoracentesis repeated in ED on admit with 1.2 L removed  · PleurX catheter placed on left at bedside 12/27  · Has a right-sided effusion building up, may need a second PleurX in future; will go by symptoms and sats  · Pt and family will require training in PleurX management          Pt is making progress and may discharge home this weekend. An important question is whether she will need a PleurX on the right; will monitor sats and symptoms daily.     Her abdominal pain does not seem to reflect obstruction, as she is having BMs. However, it is concerning given her substantial tumor burden. Consider GI evaluation.     Pulmonary will continue to follow Ms. Falcon.     Bryan Dennis, PGY-5  Pulmonary & Critical Care Medicine  pager [phone]
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
## 2018-01-02 LAB — EKG IMPRESSION: NORMAL

## 2020-01-27 ENCOUNTER — OFFICE VISIT (OUTPATIENT)
Dept: URGENT CARE | Facility: CLINIC | Age: 50
End: 2020-01-27
Payer: COMMERCIAL

## 2020-01-27 VITALS
BODY MASS INDEX: 21.51 KG/M2 | HEIGHT: 64 IN | OXYGEN SATURATION: 98 % | SYSTOLIC BLOOD PRESSURE: 102 MMHG | TEMPERATURE: 97.8 F | DIASTOLIC BLOOD PRESSURE: 70 MMHG | HEART RATE: 65 BPM | WEIGHT: 126 LBS | RESPIRATION RATE: 14 BRPM

## 2020-01-27 DIAGNOSIS — J02.9 SORE THROAT: ICD-10-CM

## 2020-01-27 DIAGNOSIS — J06.9 UPPER RESPIRATORY TRACT INFECTION, UNSPECIFIED TYPE: Primary | ICD-10-CM

## 2020-01-27 LAB
HETEROPH AB SER QL LA: NEGATIVE
INT CON NEG: NORMAL
INT CON NEG: NORMAL
INT CON POS: NORMAL
INT CON POS: NORMAL
S PYO AG THROAT QL: NEGATIVE

## 2020-01-27 PROCEDURE — 86308 HETEROPHILE ANTIBODY SCREEN: CPT | Performed by: PHYSICIAN ASSISTANT

## 2020-01-27 PROCEDURE — 99204 OFFICE O/P NEW MOD 45 MIN: CPT | Performed by: PHYSICIAN ASSISTANT

## 2020-01-27 PROCEDURE — 87880 STREP A ASSAY W/OPTIC: CPT | Performed by: PHYSICIAN ASSISTANT

## 2020-01-27 RX ORDER — BENZONATATE 100 MG/1
100 CAPSULE ORAL 3 TIMES DAILY PRN
Qty: 15 CAP | Refills: 0 | Status: SHIPPED | OUTPATIENT
Start: 2020-01-27 | End: 2020-02-01

## 2020-01-27 RX ORDER — CODEINE PHOSPHATE/GUAIFENESIN 10-100MG/5
10 LIQUID (ML) ORAL
Qty: 50 ML | Refills: 0 | Status: SHIPPED | OUTPATIENT
Start: 2020-01-27 | End: 2020-02-01

## 2020-01-27 ASSESSMENT — ENCOUNTER SYMPTOMS
VOMITING: 0
SWOLLEN GLANDS: 1
HOARSE VOICE: 1
DIARRHEA: 0
SHORTNESS OF BREATH: 0
ABDOMINAL PAIN: 0
CONSTIPATION: 0
COUGH: 1
SORE THROAT: 1
CHILLS: 0
FEVER: 0
SPUTUM PRODUCTION: 0
NAUSEA: 0

## 2020-01-27 NOTE — PROGRESS NOTES
"Subjective:   Ailyn Hodges is a 49 y.o. female who presents for Headache; Cough; Pharyngitis; and Nasal Congestion        Pharyngitis    This is a new problem. Episode onset: 3 days. The problem has been unchanged. There has been no fever. Associated symptoms include congestion, coughing (dry ), a hoarse voice and swollen glands. Pertinent negatives include no abdominal pain, diarrhea, ear pain, plugged ear sensation, shortness of breath or vomiting. She has had exposure to mono (son ). She has had no exposure to strep. Treatments tried: Robitussin, tylenol      Pt received flu shot.     Review of Systems   Constitutional: Negative for chills, fever and malaise/fatigue.   HENT: Positive for congestion, hoarse voice and sore throat. Negative for ear pain.    Respiratory: Positive for cough (dry ). Negative for sputum production and shortness of breath.    Gastrointestinal: Negative for abdominal pain, constipation, diarrhea, nausea and vomiting.   All other systems reviewed and are negative.      PMH:  has no past medical history on file.  MEDS:   Current Outpatient Medications:   •  benzonatate (TESSALON) 100 MG Cap, Take 1 Cap by mouth 3 times a day as needed for Cough for up to 5 days., Disp: 15 Cap, Rfl: 0  •  guaifenesin-codeine (TUSSI-ORGANIDIN NR) 100-10 MG/5ML syrup, Take 10 mL by mouth every bedtime for 5 days., Disp: 50 mL, Rfl: 0  ALLERGIES:   Allergies   Allergen Reactions   • Sulfa Drugs      SURGHX:   Past Surgical History:   Procedure Laterality Date   • BREAST IMPLANT REVISION     • CARPAL TUNNEL RELEASE Right    • HYSTERECTOMY, VAGINAL       SOCHX:  reports that she has never smoked. She has never used smokeless tobacco. She reports current alcohol use. She reports that she does not use drugs.  History reviewed. No pertinent family history.     Objective:   /70 (BP Location: Right arm, Patient Position: Sitting)   Pulse 65   Temp 36.6 °C (97.8 °F)   Resp 14   Ht 1.626 m (5' 4\")   Wt 57.2 " kg (126 lb)   SpO2 98%   BMI 21.63 kg/m²     Physical Exam  Vitals signs reviewed.   Constitutional:       General: She is not in acute distress.     Appearance: She is well-developed.   HENT:      Head: Normocephalic and atraumatic.      Right Ear: Tympanic membrane and external ear normal.      Left Ear: Tympanic membrane and external ear normal.      Nose: Nasal tenderness, mucosal edema and congestion present.      Mouth/Throat:      Mouth: Mucous membranes are moist.      Pharynx: Oropharynx is clear.      Tonsils: No tonsillar exudate. Swellin+ on the right. 1+ on the left.   Eyes:      Conjunctiva/sclera: Conjunctivae normal.      Pupils: Pupils are equal, round, and reactive to light.   Neck:      Musculoskeletal: Normal range of motion and neck supple. No neck rigidity or muscular tenderness.      Trachea: No tracheal deviation.   Cardiovascular:      Rate and Rhythm: Normal rate and regular rhythm.   Pulmonary:      Effort: Pulmonary effort is normal. No respiratory distress.      Breath sounds: Normal breath sounds. No wheezing, rhonchi or rales.   Lymphadenopathy:      Cervical: Cervical adenopathy present.   Skin:     General: Skin is warm and dry.      Capillary Refill: Capillary refill takes less than 2 seconds.   Neurological:      General: No focal deficit present.      Mental Status: She is alert and oriented to person, place, and time.   Psychiatric:         Mood and Affect: Mood normal.         Behavior: Behavior normal.           Strep: neg   Mono: neg     Assessment/Plan:     1. Upper respiratory tract infection, unspecified type  benzonatate (TESSALON) 100 MG Cap    guaifenesin-codeine (TUSSI-ORGANIDIN NR) 100-10 MG/5ML syrup   2. Sore throat  POCT Rapid Strep A    POCT Mononucleosis (mono)     We discussed sx are likely due to viral etiology. Supportive care reviewed. Start warm salt water gargles, increase fluids, nasal saline rinse. URI handout provided.     NV  was reviewed by  myself-  Document  does not reveal any concerning patterns. Pt. was advised to avoid the operation of heavy machine along with driving while on such medications. Finally pt. was advised to use medication only as prescribed.     Follow-up with primary care provider within 7-10 days.  If symptoms worsen or persist patient can return to clinic for reevaluation. All side effects of medication discussed including allergic response, GI upset, tendon injury, etc. Patient confirmed understanding of information.    Please note that this dictation was created using voice recognition software. I have made every reasonable attempt to correct obvious errors, but I expect that there are errors of grammar and possibly content that I did not discover before finalizing the note.

## 2020-01-27 NOTE — PATIENT INSTRUCTIONS
"Upper Respiratory Infection, Adult  Most upper respiratory infections (URIs) are a viral infection of the air passages leading to the lungs. A URI affects the nose, throat, and upper air passages. The most common type of URI is nasopharyngitis and is typically referred to as \"the common cold.\"  URIs run their course and usually go away on their own. Most of the time, a URI does not require medical attention, but sometimes a bacterial infection in the upper airways can follow a viral infection. This is called a secondary infection. Sinus and middle ear infections are common types of secondary upper respiratory infections.  Bacterial pneumonia can also complicate a URI. A URI can worsen asthma and chronic obstructive pulmonary disease (COPD). Sometimes, these complications can require emergency medical care and may be life threatening.  What are the causes?  Almost all URIs are caused by viruses. A virus is a type of germ and can spread from one person to another.  What increases the risk?  You may be at risk for a URI if:  · You smoke.  · You have chronic heart or lung disease.  · You have a weakened defense (immune) system.  · You are very young or very old.  · You have nasal allergies or asthma.  · You work in crowded or poorly ventilated areas.  · You work in health care facilities or schools.  What are the signs or symptoms?  Symptoms typically develop 2-3 days after you come in contact with a cold virus. Most viral URIs last 7-10 days. However, viral URIs from the influenza virus (flu virus) can last 14-18 days and are typically more severe. Symptoms may include:  · Runny or stuffy (congested) nose.  · Sneezing.  · Cough.  · Sore throat.  · Headache.  · Fatigue.  · Fever.  · Loss of appetite.  · Pain in your forehead, behind your eyes, and over your cheekbones (sinus pain).  · Muscle aches.  How is this diagnosed?  Your health care provider may diagnose a URI by:  · Physical exam.  · Tests to check that your " symptoms are not due to another condition such as:  ¨ Strep throat.  ¨ Sinusitis.  ¨ Pneumonia.  ¨ Asthma.  How is this treated?  A URI goes away on its own with time. It cannot be cured with medicines, but medicines may be prescribed or recommended to relieve symptoms. Medicines may help:  · Reduce your fever.  · Reduce your cough.  · Relieve nasal congestion.  Follow these instructions at home:  · Take medicines only as directed by your health care provider.  · Gargle warm saltwater or take cough drops to comfort your throat as directed by your health care provider.  · Use a warm mist humidifier or inhale steam from a shower to increase air moisture. This may make it easier to breathe.  · Drink enough fluid to keep your urine clear or pale yellow.  · Eat soups and other clear broths and maintain good nutrition.  · Rest as needed.  · Return to work when your temperature has returned to normal or as your health care provider advises. You may need to stay home longer to avoid infecting others. You can also use a face mask and careful hand washing to prevent spread of the virus.  · Increase the usage of your inhaler if you have asthma.  · Do not use any tobacco products, including cigarettes, chewing tobacco, or electronic cigarettes. If you need help quitting, ask your health care provider.  How is this prevented?  The best way to protect yourself from getting a cold is to practice good hygiene.  · Avoid oral or hand contact with people with cold symptoms.  · Wash your hands often if contact occurs.  There is no clear evidence that vitamin C, vitamin E, echinacea, or exercise reduces the chance of developing a cold. However, it is always recommended to get plenty of rest, exercise, and practice good nutrition.  Contact a health care provider if:  · You are getting worse rather than better.  · Your symptoms are not controlled by medicine.  · You have chills.  · You have worsening shortness of breath.  · You have brown  or red mucus.  · You have yellow or brown nasal discharge.  · You have pain in your face, especially when you bend forward.  · You have a fever.  · You have swollen neck glands.  · You have pain while swallowing.  · You have white areas in the back of your throat.  Get help right away if:  · You have severe or persistent:  ¨ Headache.  ¨ Ear pain.  ¨ Sinus pain.  ¨ Chest pain.  · You have chronic lung disease and any of the following:  ¨ Wheezing.  ¨ Prolonged cough.  ¨ Coughing up blood.  ¨ A change in your usual mucus.  · You have a stiff neck.  · You have changes in your:  ¨ Vision.  ¨ Hearing.  ¨ Thinking.  ¨ Mood.  This information is not intended to replace advice given to you by your health care provider. Make sure you discuss any questions you have with your health care provider.  Document Released: 06/13/2002 Document Revised: 08/20/2017 Document Reviewed: 03/25/2015  [employer] Interactive Patient Education © 2017 [employer]

## 2020-07-06 ENCOUNTER — HOSPITAL ENCOUNTER (OUTPATIENT)
Dept: RADIOLOGY | Facility: MEDICAL CENTER | Age: 50
End: 2020-07-06
Attending: INTERNAL MEDICINE
Payer: COMMERCIAL

## 2020-07-06 DIAGNOSIS — Z00.00 EXAMINATION, MEDICAL, GENERAL: ICD-10-CM

## 2020-07-06 PROCEDURE — 4410556 CT-CARDIAC SCORING

## 2020-10-12 ENCOUNTER — NURSE TRIAGE (OUTPATIENT)
Dept: HEALTH INFORMATION MANAGEMENT | Facility: OTHER | Age: 50
End: 2020-10-12

## 2020-10-12 NOTE — TELEPHONE ENCOUNTER
[phone]    Patient calling about Covid testing for herself, daughter and .  Ptient has no symptoms.     and daughter have angel sick with fever, Wednesday of last week with sore throat and stuffy nose.   was sick after daughter with no fever.  He has cough with phlegm and nasal congestion.      Reason for Disposition  • COVID-19 Testing, questions about    Additional Information  • Negative: SEVERE difficulty breathing (e.g., struggling for each breath, speaks in single words)  • Negative: Difficult to awaken or acting confused (e.g., disoriented, slurred speech)  • Negative: Bluish (or gray) lips or face now  • Negative: Shock suspected (e.g., cold/pale/clammy skin, too weak to stand, low BP, rapid pulse)  • Negative: Sounds like a life-threatening emergency to the triager  • Negative: [1] COVID-19 suspected (e.g., cough, fever, shortness of breath) AND [2] public health department recommends testing  • Negative: [1] COVID-19 exposure AND [2] no symptoms  • Negative: COVID-19 and Breastfeeding, questions about  • Negative: SEVERE or constant chest pain (Exception: mild central chest pain, present only when coughing)  • Negative: MODERATE difficulty breathing (e.g., speaks in phrases, SOB even at rest, pulse 100-120)  • Negative: MILD difficulty breathing (e.g., minimal/no SOB at rest, SOB with walking, pulse <100)  • Negative: Chest pain  • Negative: Patient sounds very sick or weak to the triager  • Negative: Fever > 103 F (39.4 C)  • Negative: [1] Fever > 101 F (38.3 C) AND [2] age > 60  • Negative: [1] Fever > 100.0 F (37.8 C) AND [2] bedridden (e.g., nursing home patient, CVA, chronic illness, recovering from surgery)  • Negative: HIGH RISK patient (e.g., age > 64 years, diabetes, heart or lung disease, weak immune system)  • Negative: Fever present > 3 days (72 hours)  • Negative: [1] Fever returns after gone for over 24 hours AND [2] symptoms worse or not improved  • Negative: [1]  "Continuous (nonstop) coughing interferes with work or school AND [2] no improvement using cough treatment per protocol  • Negative: Cough present > 3 weeks  • Negative: COVID-19 Prevention and Healthy Living, questions about  • Negative: COVID-19 Home Isolation, questions about  • Negative: COVID-19, questions about  • Negative: [1] COVID-19 infection diagnosed or suspected AND [2] mild symptoms (fever, cough) AND [3] no trouble breathing or other complications    Answer Assessment - Initial Assessment Questions  1. COVID-19 DIAGNOSIS: \"Who made your Coronavirus (COVID-19) diagnosis?\" \"Was it confirmed by a positive lab test?\" If not diagnosed by a HCP, ask \"Are there lots of cases (community spread) where you live?\" (See public health department website, if unsure)    * MAJOR community spread: high number of cases; numbers of cases are increasing; many people hospitalized.    * MINOR community spread: low number of cases; not increasing; few or no people hospitalized      no  2. ONSET: \"When did the COVID-19 symptoms start?\"       no  3. WORST SYMPTOM: \"What is your worst symptom?\" (e.g., cough, fever, shortness of breath, muscle aches)      no  4. COUGH: \"How bad is the cough?\"        no  5. FEVER: \"Do you have a fever?\" If so, ask: \"What is your temperature, how was it measured, and when did it start?\"      no  6. RESPIRATORY STATUS: \"Describe your breathing?\" (e.g., shortness of breath, wheezing, unable to speak)       no  7. BETTER-SAME-WORSE: \"Are you getting better, staying the same or getting worse compared to yesterday?\"  If getting worse, ask, \"In what way?\"      no  8. HIGH RISK DISEASE: \"Do you have any chronic medical problems?\" (e.g., asthma, heart or lung disease, weak immune system, etc.)      no  9. PREGNANCY: \"Is there any chance you are pregnant?\" \"When was your last menstrual period?\"      no  10. OTHER SYMPTOMS: \"Do you have any other symptoms?\"  (e.g., runny nose, headache, sore throat, loss of " smell)        nothing    Protocols used: CORONAVIRUS (COVID-19) DIAGNOSED OR [id number]

## 2021-01-17 ENCOUNTER — HOSPITAL ENCOUNTER (OUTPATIENT)
Facility: MEDICAL CENTER | Age: 51
End: 2021-01-17
Attending: PHYSICIAN ASSISTANT
Payer: COMMERCIAL

## 2021-01-17 ENCOUNTER — OFFICE VISIT (OUTPATIENT)
Dept: URGENT CARE | Facility: CLINIC | Age: 51
End: 2021-01-17
Payer: COMMERCIAL

## 2021-01-17 VITALS
OXYGEN SATURATION: 99 % | BODY MASS INDEX: 23.05 KG/M2 | RESPIRATION RATE: 16 BRPM | WEIGHT: 135 LBS | DIASTOLIC BLOOD PRESSURE: 82 MMHG | HEART RATE: 74 BPM | HEIGHT: 64 IN | TEMPERATURE: 97.5 F | SYSTOLIC BLOOD PRESSURE: 120 MMHG

## 2021-01-17 DIAGNOSIS — R53.83 MALAISE AND FATIGUE: ICD-10-CM

## 2021-01-17 DIAGNOSIS — R05.9 COUGH: ICD-10-CM

## 2021-01-17 DIAGNOSIS — R53.81 MALAISE AND FATIGUE: ICD-10-CM

## 2021-01-17 PROCEDURE — U0003 INFECTIOUS AGENT DETECTION BY NUCLEIC ACID (DNA OR RNA); SEVERE ACUTE RESPIRATORY SYNDROME CORONAVIRUS 2 (SARS-COV-2) (CORONAVIRUS DISEASE [COVID-19]), AMPLIFIED PROBE TECHNIQUE, MAKING USE OF HIGH THROUGHPUT TECHNOLOGIES AS DESCRIBED BY CMS-2020-01-R: HCPCS

## 2021-01-17 PROCEDURE — 99203 OFFICE O/P NEW LOW 30 MIN: CPT | Performed by: PHYSICIAN ASSISTANT

## 2021-01-17 PROCEDURE — U0005 INFEC AGEN DETEC AMPLI PROBE: HCPCS

## 2021-01-17 RX ORDER — LEVOTHYROXINE SODIUM 0.07 MG/1
75 TABLET ORAL
COMMUNITY

## 2021-01-17 RX ORDER — BENZONATATE 200 MG/1
200 CAPSULE ORAL 3 TIMES DAILY PRN
Qty: 60 CAP | Refills: 0 | Status: SHIPPED | OUTPATIENT
Start: 2021-01-17 | End: 2023-04-15

## 2021-01-17 RX ORDER — DEXTROMETHORPHAN HYDROBROMIDE AND PROMETHAZINE HYDROCHLORIDE 15; 6.25 MG/5ML; MG/5ML
5 SYRUP ORAL EVERY 4 HOURS PRN
Qty: 120 ML | Refills: 0 | Status: SHIPPED | OUTPATIENT
Start: 2021-01-17 | End: 2023-04-15

## 2021-01-17 ASSESSMENT — ENCOUNTER SYMPTOMS
CONSTIPATION: 0
FEVER: 1
SORE THROAT: 1
COUGH: 1
ABDOMINAL PAIN: 0
SWEATS: 1
MYALGIAS: 1
NAUSEA: 1
BLOOD IN STOOL: 0
VOMITING: 0
HEADACHES: 1
SHORTNESS OF BREATH: 0
RHINORRHEA: 1
DIARRHEA: 0
HEARTBURN: 1
DIZZINESS: 0
CHILLS: 1

## 2021-01-17 ASSESSMENT — COPD QUESTIONNAIRES: COPD: 0

## 2021-01-17 NOTE — PROGRESS NOTES
Subjective:   Ailyn Garcia is a 50 y.o. female who presents for Cough (headache,nausea x 1 days; has had chills, fever, gi issues x 1 week )        Cough  This is a new problem. The current episode started in the past 7 days. The problem has been gradually worsening (Initial improvement, worsened today). The problem occurs constantly. The cough is non-productive. Associated symptoms include chills, a fever (Tmax 99, subjective), headaches, heartburn (baseline- 2 weeks, but has been worsening with cough), myalgias, postnasal drip, rhinorrhea, a sore throat and sweats. Pertinent negatives include no chest pain, ear congestion, ear pain, nasal congestion or shortness of breath. The symptoms are aggravated by lying down. She has tried OTC cough suppressant for the symptoms. The treatment provided moderate relief. There is no history of asthma or COPD.     Review of Systems   Constitutional: Positive for chills, fever (Tmax 99, subjective) and malaise/fatigue.   HENT: Positive for congestion, postnasal drip, rhinorrhea and sore throat. Negative for ear pain.    Respiratory: Positive for cough. Negative for shortness of breath.    Cardiovascular: Negative for chest pain.   Gastrointestinal: Positive for heartburn (baseline- 2 weeks, but has been worsening with cough) and nausea. Negative for abdominal pain, blood in stool, constipation, diarrhea, melena and vomiting.   Musculoskeletal: Positive for myalgias.   Neurological: Positive for headaches. Negative for dizziness.       PMH:  has no past medical history of Asthma.  MEDS:   Current Outpatient Medications:   •  levothyroxine (SYNTHROID) 75 MCG Tab, Take 75 mcg by mouth Every morning on an empty stomach., Disp: , Rfl:   •  OMEPRAZOLE PO, Take 20 mg by mouth., Disp: , Rfl:   •  benzonatate (TESSALON) 200 MG capsule, Take 1 Cap by mouth 3 times a day as needed for Cough., Disp: 60 Cap, Rfl: 0  •  promethazine-dextromethorphan (PROMETHAZINE-DM) 6.25-15 MG/5ML syrup,  "Take 5 mL by mouth every four hours as needed for Cough., Disp: 120 mL, Rfl: 0  ALLERGIES:   Allergies   Allergen Reactions   • Sulfa Drugs      SURGHX: History reviewed. No pertinent surgical history.  SOCHX:  reports that she has never smoked. She has never used smokeless tobacco. She reports current alcohol use.  FH: Family history was reviewed, no pertinent findings to report   Objective:   /82   Pulse 74   Temp 36.4 °C (97.5 °F)   Resp 16   Ht 1.626 m (5' 4\")   Wt 61.2 kg (135 lb)   SpO2 99%   BMI 23.17 kg/m²   Physical Exam  Vitals signs reviewed.   HENT:      Right Ear: Tympanic membrane, ear canal and external ear normal.      Left Ear: Ear canal and external ear normal. A middle ear effusion is present.      Nose: Mucosal edema and rhinorrhea present. No congestion. Rhinorrhea is clear.      Mouth/Throat:      Lips: Pink.      Mouth: Mucous membranes are moist.      Pharynx: Oropharynx is clear. Uvula midline.   Cardiovascular:      Rate and Rhythm: Normal rate and regular rhythm.      Heart sounds: Normal heart sounds, S1 normal and S2 normal.   Pulmonary:      Effort: Pulmonary effort is normal. No respiratory distress.      Breath sounds: Normal breath sounds. No decreased breath sounds, wheezing, rhonchi or rales.      Comments: Frequent dry cough.  Oral  Abdominal:      General: Abdomen is flat.      Palpations: Abdomen is soft.      Tenderness: There is abdominal tenderness (mild) in the epigastric area. There is no guarding or rebound. Negative signs include Lux's sign and McBurney's sign.           Assessment/Plan:   1. Cough  - COVID/SARS CoV-2 PCR; Future  - benzonatate (TESSALON) 200 MG capsule; Take 1 Cap by mouth 3 times a day as needed for Cough.  Dispense: 60 Cap; Refill: 0  - promethazine-dextromethorphan (PROMETHAZINE-DM) 6.25-15 MG/5ML syrup; Take 5 mL by mouth every four hours as needed for Cough.  Dispense: 120 mL; Refill: 0    2. Malaise and fatigue    Other orders  - " levothyroxine (SYNTHROID) 75 MCG Tab; Take 75 mcg by mouth Every morning on an empty stomach.  - OMEPRAZOLE PO; Take 20 mg by mouth.    Discussed with patient signs and symptoms most likely are due to a viral etiology.     Test for COVID-19. We will call/message back for results and appropriate further instructions. Instructed to sign up for [employer] if they have not already. Result will be released to [employer] application.   Symptomatic and supportive care:   Plenty of oral hydration and rest   Over the counter cough suppressant as directed.  Tylenol or ibuprofen for pain and fever as directed.   Saline nasal spray and Flonase as a decongestant.   Infection control measures at home. Stay away from people, Hand washing, covering sneeze/cough, disinfect surfaces.   Remain home from work, school, and other populated environments.     Differential diagnosis, natural history, supportive care, and indications for immediate follow-up discussed.

## 2021-01-18 DIAGNOSIS — R05.9 COUGH: ICD-10-CM

## 2021-01-19 LAB
COVID ORDER STATUS COVID19: NORMAL
SARS-COV-2 RNA RESP QL NAA+PROBE: NOTDETECTED
SPECIMEN SOURCE: NORMAL

## 2021-02-17 ENCOUNTER — TELEPHONE (OUTPATIENT)
Dept: URGENT CARE | Facility: CLINIC | Age: 51
End: 2021-02-17

## 2021-02-17 DIAGNOSIS — R05.9 COUGH: ICD-10-CM

## 2021-02-17 RX ORDER — DEXTROMETHORPHAN HYDROBROMIDE AND PROMETHAZINE HYDROCHLORIDE 15; 6.25 MG/5ML; MG/5ML
5 SYRUP ORAL EVERY 4 HOURS PRN
Qty: 120 ML | Refills: 0 | Status: CANCELLED | OUTPATIENT
Start: 2021-02-17

## 2021-02-17 RX ORDER — BENZONATATE 200 MG/1
200 CAPSULE ORAL 3 TIMES DAILY PRN
Qty: 60 CAPSULE | Refills: 0 | Status: CANCELLED | OUTPATIENT
Start: 2021-02-17

## 2021-02-17 NOTE — TELEPHONE ENCOUNTER
----- Message from Dinorah Cordoba sent at 2/14/2021  6:39 PM PST -----  Regarding: FW: Cough returned  Contact: [phone]    ----- Message -----  From: Ailyn Garcia  Sent: 2/14/2021   9:31 AM PST  To: [facility]  Subject: Cough returned                                   Topic: Bill/Statement    Hello.  My cough returned last night with the mucus.  I will start the mucinex again.  No fever. I was going to get a refill on meds but there are none.  Same symptoms.  Could you please send in a refill on both meds given, the cough syrup and the little cough tabs please?

## 2022-05-03 ENCOUNTER — RESEARCH ENCOUNTER (OUTPATIENT)
Dept: MEDICAL GROUP | Facility: PHYSICIAN GROUP | Age: 52
End: 2022-05-03

## 2022-05-03 DIAGNOSIS — Z00.6 RESEARCH STUDY PATIENT: ICD-10-CM

## 2022-05-26 LAB
APOB+LDLR+PCSK9 GENE MUT ANL BLD/T: NOT DETECTED
BRCA1+BRCA2 DEL+DUP + FULL MUT ANL BLD/T: NOT DETECTED
MLH1+MSH2+MSH6+PMS2 GN DEL+DUP+FUL M: NOT DETECTED

## 2023-04-15 ENCOUNTER — TELEPHONE (OUTPATIENT)
Dept: URGENT CARE | Facility: CLINIC | Age: 53
End: 2023-04-15

## 2023-04-15 ENCOUNTER — OFFICE VISIT (OUTPATIENT)
Dept: URGENT CARE | Facility: CLINIC | Age: 53
End: 2023-04-15
Payer: COMMERCIAL

## 2023-04-15 VITALS
HEART RATE: 92 BPM | RESPIRATION RATE: 14 BRPM | TEMPERATURE: 98.1 F | WEIGHT: 130 LBS | OXYGEN SATURATION: 99 % | HEIGHT: 65 IN | DIASTOLIC BLOOD PRESSURE: 62 MMHG | BODY MASS INDEX: 21.66 KG/M2 | SYSTOLIC BLOOD PRESSURE: 108 MMHG

## 2023-04-15 DIAGNOSIS — J06.9 URTI (ACUTE UPPER RESPIRATORY INFECTION): ICD-10-CM

## 2023-04-15 DIAGNOSIS — J02.9 SORE THROAT: ICD-10-CM

## 2023-04-15 DIAGNOSIS — R05.1 ACUTE COUGH: ICD-10-CM

## 2023-04-15 LAB — S PYO DNA SPEC NAA+PROBE: NOT DETECTED

## 2023-04-15 PROCEDURE — 87651 STREP A DNA AMP PROBE: CPT

## 2023-04-15 PROCEDURE — 99213 OFFICE O/P EST LOW 20 MIN: CPT

## 2023-04-15 RX ORDER — LEVOTHYROXINE, LIOTHYRONINE 19; 4.5 UG/1; UG/1
TABLET ORAL
COMMUNITY
Start: 2023-03-15

## 2023-04-15 RX ORDER — BENZONATATE 100 MG/1
100 CAPSULE ORAL 3 TIMES DAILY PRN
Qty: 30 CAPSULE | Refills: 0 | Status: SHIPPED | OUTPATIENT
Start: 2023-04-15

## 2023-04-15 ASSESSMENT — ENCOUNTER SYMPTOMS
COUGH: 1
SPUTUM PRODUCTION: 1
SORE THROAT: 1
VOMITING: 0
NAUSEA: 1
CHILLS: 0
HEADACHES: 1
FEVER: 0
DIARRHEA: 0

## 2023-04-15 NOTE — PROGRESS NOTES
Subjective     Ailyn Garcia is a 53 y.o. female who presents with Pharyngitis (Headache, ears feel plugged, cough, stuffy nose, green phlegm x 1 week )            Pharyngitis   This is a new problem. The current episode started in the past 7 days. The problem has been gradually worsening. There has been no fever. Associated symptoms include congestion, coughing (productive) and headaches. Pertinent negatives include no diarrhea or vomiting. Treatments tried: mucinex, DayQuil, Nyquil. The treatment provided mild relief.     Patient presents with symptoms for a week now.  She reports sore throat, which she describes as painful swallowing.  She further notes rhinorrhea, nasal congestion, productive cough, and headaches.  She denies any fevers or chills.  She has been taking Mucinex, DayQuil, and NyQuil, providing mild relief.  She reports exposure to stepdaughter who was sick with strep.  Patient reports being COVID vaccinated.  She reports testing negative for COVID at home.        Patient's current problem list, medications, and past medical/surgical history were reviewed in Epic.    PMH:  has no past medical history of Asthma.  MEDS:   Current Outpatient Medications:     metFORMIN (GLUCOPHAGE) 500 MG Tab, , Disp: , Rfl:     NP THYROID 30 MG Tab, , Disp: , Rfl:     ascorbic acid (VITAMIN C) 500 MG tablet, Take  by mouth., Disp: , Rfl:     azelastine (ASTELIN) 137 MCG/SPRAY nasal spray, azelastine 137 mcg (0.1 %) nasal spray aerosol, Disp: , Rfl:     vitamin D (VITAMIND D3) 1000 UNIT Tab, Take 1,000 Units by mouth every day., Disp: , Rfl:     Coenzyme Q10 (VITALINE COQ10) 300 MG Wafer, Take 300 mg by mouth., Disp: , Rfl:     esterified estrogens-methyltest (ESTRATEST HS) 0.625-1.25 MG Tab, EEMT HS 0.625 mg-1.25 mg tablet, Disp: , Rfl:     loratadine (CLARITIN) 10 MG Tab, Take 10 mg by mouth every day., Disp: , Rfl:     melatonin 1 mg Tab, Take 2 mg by mouth., Disp: , Rfl:     onabotulinum toxin A (BOTOX) 200 units  "Recon Soln injection, Botox 200 unit injection  INJECT 200 UNITS IN THE MUSCLE ONCE FOR 1 DOSE, Disp: , Rfl:     Rimegepant Sulfate (NURTEC) 75 MG TABLET DISPERSIBLE, Nurtec ODT 75 mg disintegrating tablet, Disp: , Rfl:     tretinoin (RETIN-A) 0.1 % cream, , Disp: , Rfl:     triamcinolone acetonide (KENALOG) 0.1 % Cream, triamcinolone acetonide 0.1 % topical cream, Disp: , Rfl:     levothyroxine (SYNTHROID) 75 MCG Tab, Take 75 mcg by mouth Every morning on an empty stomach., Disp: , Rfl:     Alpha-Lipoic Acid 100 MG Cap, Take 250 mg by mouth every day. (Patient not taking: Reported on 4/15/2023), Disp: , Rfl:     rosuvastatin (CRESTOR) 20 MG Tab, rosuvastatin 20 mg tablet (Patient not taking: Reported on 4/15/2023), Disp: , Rfl:   ALLERGIES:   Allergies   Allergen Reactions    Sulfa Drugs      SURGHX:   Past Surgical History:   Procedure Laterality Date    BREAST IMPLANT REVISION      CARPAL TUNNEL RELEASE Right     HYSTERECTOMY, VAGINAL       SOCHX:  reports that she has never smoked. She has never used smokeless tobacco. She reports current alcohol use. She reports that she does not use drugs.  FH: Reviewed with patient, not pertinent to this visit.     Review of Systems   Constitutional:  Positive for malaise/fatigue. Negative for chills and fever.   HENT:  Positive for congestion and sore throat.    Respiratory:  Positive for cough (productive) and sputum production.    Gastrointestinal:  Positive for nausea. Negative for diarrhea and vomiting.   Neurological:  Positive for headaches.            Objective     /62 (BP Location: Right arm, Patient Position: Sitting, BP Cuff Size: Adult)   Pulse 92   Temp 36.7 °C (98.1 °F) (Temporal)   Resp 14   Ht 1.638 m (5' 4.5\")   Wt 59 kg (130 lb)   SpO2 99%   BMI 21.97 kg/m²      Physical Exam  Constitutional:       Appearance: Normal appearance.   HENT:      Head: Normocephalic.      Right Ear: Tympanic membrane, ear canal and external ear normal.      Left Ear: " Ear canal and external ear normal.      Nose: Congestion present.      Mouth/Throat:      Pharynx: Posterior oropharyngeal erythema present.   Eyes:      Extraocular Movements: Extraocular movements intact.   Cardiovascular:      Rate and Rhythm: Normal rate and regular rhythm.      Pulses: Normal pulses.      Heart sounds: Normal heart sounds.   Pulmonary:      Effort: Pulmonary effort is normal.      Breath sounds: Normal breath sounds.   Musculoskeletal:         General: Normal range of motion.      Cervical back: Normal range of motion.   Skin:     General: Skin is warm and dry.   Neurological:      General: No focal deficit present.      Mental Status: She is alert.   Psychiatric:         Mood and Affect: Mood normal.         Behavior: Behavior normal.         Judgment: Judgment normal.     Results:    Strep a-negative       Assessment & Plan     1. URTI (acute upper respiratory infection)    - benzonatate (TESSALON) 100 MG Cap; Take 1 Capsule by mouth 3 times a day as needed for Cough.  Dispense: 30 Capsule; Refill: 0    2. Sore throat    - POCT CEPHEID GROUP A STREP - PCR    3. Acute cough    - benzonatate (TESSALON) 100 MG Cap; Take 1 Capsule by mouth 3 times a day as needed for Cough.  Dispense: 30 Capsule; Refill: 0         Patient tested negative for strep A.  Her presentation is consistent with an upper respiratory tract infection, most likely viral.  Advised on symptomatic treatment at home.  May take Tessalon Perles twice daily as needed for cough.  OTC antihistamine and Flonase nasal spray for nasal congestion. Discussed treatment plan with patient, she is agreeable and verbalized understanding.  Educated patient on signs and symptoms watch out for, when to return to the clinic or go to the ER.    Recommended supportive treatment at home:  OTC Tylenol or Motrin for fever/discomfort.  OTC supportive care for nasal congestion - saline nasal spray/Flonase nasal spray and/or netipot  Humidifier and steam  inhalation/warm showers.  Increase oral fluid intake.  Warm saline gargles for sore throat.      Electronically Signed by DYAN Jara

## 2025-01-22 PROBLEM — M76.61 INSERTIONAL TENDINOPATHY OF RIGHT ACHILLES TENDON: Status: ACTIVE | Noted: 2025-01-22

## 2025-01-22 PROBLEM — M92.61 HAGLUND'S DEFORMITY OF RIGHT HEEL: Status: ACTIVE | Noted: 2025-01-22

## 2025-03-20 ENCOUNTER — OFFICE VISIT (OUTPATIENT)
Dept: SPORTS MEDICINE | Facility: OTHER | Age: 55
End: 2025-03-20
Payer: COMMERCIAL

## 2025-03-20 ENCOUNTER — HOSPITAL ENCOUNTER (OUTPATIENT)
Dept: RADIOLOGY | Facility: MEDICAL CENTER | Age: 55
End: 2025-03-20

## 2025-03-20 VITALS
TEMPERATURE: 96.5 F | BODY MASS INDEX: 20.66 KG/M2 | HEIGHT: 65 IN | OXYGEN SATURATION: 96 % | WEIGHT: 124 LBS | SYSTOLIC BLOOD PRESSURE: 116 MMHG | DIASTOLIC BLOOD PRESSURE: 72 MMHG | RESPIRATION RATE: 16 BRPM | HEART RATE: 63 BPM

## 2025-03-20 DIAGNOSIS — S60.212D CONTUSION OF LEFT WRIST, SUBSEQUENT ENCOUNTER: ICD-10-CM

## 2025-03-20 PROBLEM — M92.62 HAGLUND'S DEFORMITY OF LEFT HEEL: Status: ACTIVE | Noted: 2025-01-22

## 2025-03-20 PROBLEM — M76.62: Status: ACTIVE | Noted: 2025-01-22

## 2025-03-20 PROBLEM — M76.61: Status: ACTIVE | Noted: 2025-01-22

## 2025-03-20 PROCEDURE — 3074F SYST BP LT 130 MM HG: CPT | Performed by: FAMILY MEDICINE

## 2025-03-20 PROCEDURE — 99214 OFFICE O/P EST MOD 30 MIN: CPT | Performed by: FAMILY MEDICINE

## 2025-03-20 PROCEDURE — 3078F DIAST BP <80 MM HG: CPT | Performed by: FAMILY MEDICINE

## 2025-03-20 ASSESSMENT — ENCOUNTER SYMPTOMS
SHORTNESS OF BREATH: 0
VOMITING: 0
CHILLS: 0
FEVER: 0
DIZZINESS: 0
NAUSEA: 0

## 2025-03-20 NOTE — PROGRESS NOTES
Chief Complaint   Patient presents with    Wrist Pain     L wrist pain     Finger Pain     R ring finger pain      Subjective     Referred by Self-referred for evaluation of LEFT wrist pain  Date of injury, March 5, 2025  Mechanism, skiing in [place]  She was skiing down any steep location and fell after hitting a branch onto some hard packs now  She does not recall having significant pain at the time, but after that run, which unfortunately involved a couple of more falls she experienced pain at the radial aspect of the LEFT wrist and the base of the thumb  She also noticed swelling in this region  She was seen by local physiotherapist that thought she might have injured her scaphoid  Upon returning to [place] she was seen at [facility] on March, 17 2025  Had x-rays  Icing and splinting of the helped temporarily  She was told that it was not her scaphoid, but her CMC joint which demonstrated some osteoarthritis on x-ray  She was fitted with a thumb spica splint and advised to follow-up  Symptoms are improved with immobilization  Worse with movement  She did notice difficulty with gripping and pincer grasp after the incident  Her pain is 2 out of 10 at baseline and can get as severe as 8 out of 10  No night symptoms but she has been wearing her splint  No radiation  She was experiencing some in the thumb    Also issues with the RIGHT ring finger  [place] which was cut open before her [place] trip    , , 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   Respiratory:  Negative for shortness of breath.    Cardiovascular:  Negative for chest pain.   Gastrointestinal:  Negative for nausea and vomiting.   Neurological:  Negative for dizziness.     PMH:  has no past medical history of Asthma.  MEDS:   Current Outpatient Medications:     meloxicam (MOBIC) 15 MG tablet, Take 1 Tablet by mouth every day for 30 days.,  Disp: 30 Tablet, Rfl: 0    azelastine (OPTIVAR) 0.05 % ophthalmic solution, , Disp: , Rfl:     NALTREXONE HCL PO, , Disp: , Rfl:     Fluocinolone Acetonide Body 0.01 % Oil, , Disp: , Rfl:     meloxicam (MOBIC) 15 MG tablet, Take 1 Tablet by mouth every day., Disp: 30 Tablet, Rfl: 1    Montelukast Sodium (SINGULAIR PO), , Disp: , Rfl:     albuterol 108 (90 Base) MCG/ACT Aero Soln inhalation aerosol, Inhale 2 puffs every 4 hours by inhalation route., Disp: , Rfl:     Cetirizine HCl (ZYRTEC ALLERGY) 10 MG Cap, , Disp: , Rfl:     famotidine (PEPCID) 20 MG Tab, , Disp: , Rfl:     fexofenadine (ALLEGRA ALLERGY) 180 MG tablet, , Disp: , Rfl:     Fremanezumab-vfrm (AJOVY) 225 MG/1.5ML Solution Auto-injector, , Disp: , Rfl:     levothyroxine (SYNTHROID) 88 MCG Tab, Take 88 mcg by mouth., Disp: , Rfl:     metFORMIN (GLUCOPHAGE) 500 MG Tab, Take 500 mg by mouth., Disp: , Rfl:     thyroid (ARMOUR THYROID) 15 MG Tab, Take 1 Tablet by mouth every day., Disp: , Rfl:     meloxicam (MOBIC) 15 MG tablet, TAKE 1 TABLET BY MOUTH EVERY DAY, Disp: 30 Tablet, Rfl: 1    progesterone (PROMETRIUM) 100 MG Cap, 1 SUPP(s), Disp: , Rfl:     thyroid (ARMOUR THYROID) 15 MG Tab, 1 tab(s), Disp: , Rfl:     thyroid (NP THYROID) 30 MG Tab, , Disp: , Rfl:     tizanidine (ZANAFLEX) 4 MG Tab, Take 4 mg by mouth. AS NEEDED, Disp: , Rfl:     predniSONE (DELTASONE) 20 MG Tab, AS NEEDED, Disp: , Rfl:     clobetasol (TEMOVATE) 0.05 % Ointment, clobetasol 0.05 % topical ointment, Disp: , Rfl:     acyclovir (ZOVIRAX) 400 MG tablet, AS NEEDED, Disp: , Rfl:     Spacer/Aero-Holding Chambers (EASIVENT) Misc, EasiVent Holding Chamber, Disp: , Rfl:     DIGESTIVE ENZYMES PO, Take  by mouth., Disp: , Rfl:     CALCIUM CARBONATE-VIT D-MIN PO, Take  by mouth., Disp: , Rfl:     B COMPLEX VITAMINS PO, Take  by mouth., Disp: , Rfl:     ascorbic acid (VITAMIN C) 500 MG tablet, Take  by mouth., Disp: , Rfl:     azelastine (ASTELIN) 137 MCG/SPRAY nasal spray, azelastine 137 mcg  "(0.1 %) nasal spray aerosol, Disp: , Rfl:     vitamin D (VITAMIND D3) 1000 UNIT Tab, Take 1,000 Units by mouth every day., Disp: , Rfl:     Coenzyme Q10 (VITALINE COQ10) 300 MG Wafer, Take 300 mg by mouth., Disp: , Rfl:     esterified estrogens-methyltest (ESTRATEST HS) 0.625-1.25 MG Tab, EEMT HS 0.625 mg-1.25 mg tablet, Disp: , Rfl:     melatonin 1 mg Tab, Take 2 mg by mouth., Disp: , Rfl:     onabotulinum toxin A (BOTOX) 200 units Recon Soln injection, Botox 200 unit injection  INJECT 200 UNITS IN THE MUSCLE ONCE FOR 1 DOSE, Disp: , Rfl:     tretinoin (RETIN-A) 0.1 % cream, , Disp: , Rfl:     triamcinolone acetonide (KENALOG) 0.1 % Cream, triamcinolone acetonide 0.1 % topical cream, Disp: , Rfl:   ALLERGIES:   Allergies   Allergen Reactions    Latex Hives and Itching    Sulfa Drugs Hives    Sulfadiazine      Other Reaction(s): Unknown     SURGHX:   Past Surgical History:   Procedure Laterality Date    BREAST IMPLANT REVISION      CARPAL TUNNEL RELEASE Right     HYSTERECTOMY, VAGINAL       SOCHX:  reports that she has never smoked. She has never used smokeless tobacco. She reports current alcohol use. She reports that she does not use drugs.  FH: Family history was reviewed, no pertinent findings to report    Objective   /72 (BP Location: Right arm, Patient Position: Sitting, BP Cuff Size: Adult)   Pulse 63   Temp 35.8 °C (96.5 °F) (Temporal)   Resp 16   Ht 1.651 m (5' 5\")   Wt 56.2 kg (124 lb)   SpO2 96%   BMI 20.63 kg/m²     Hand exam    NAD  Alert and oriented    BILATERAL ELBOW exam  Range of motion intact  No swelling  No tenderness the medial or lateral epicondyle  Ruiz test NEGATIVE    BILATERAL WRIST exam  Range of motion intact  Mild tenderness along LEFT scaphoid, without tenderness of the TFCC insertion or distal ulna  POSITIVE tenderness to the LEFT DISTAL RADIUS  Tinel's testing is NEGATIVE  The hand is otherwise neurovascularly intact    She also has a soft tissue swelling noted at " the RIGHT ring finger DIP joint along the volar aspect which seems like a band along the PIP joint region  Nontender    1. Contusion of left wrist, subsequent encounter          Date of injury, March 5, 2025  Mechanism, [place]  She was skiing down any steep location and fell after hitting a branch onto some hard packs now    Get films [facility]     ADDENDUM:  Reviewed patient's x-rays from [facility]  She has VERY MINIMAL CMC joint OA  There is no evidence of fracture or healing fracture of the distal radius, scaphoid or wrist  (Her x-ray was done 12 days postinjury)    Suspect bone contusion of the distal radius and possibly scaphoid  Suspect she will be symptom free in the next 2 weeks    Symptoms persist she should be reevaluated                Reading Physician Reading Date Result Priority   Bernie Alejandro M.D.  [phone]     3/17/2025 Routine     Narrative  4 views of the left wrist PA, oblique, lateral, scaphoid demonstrates no  evidence of fracture or dislocation.  No radiographic evidence of SL  widening.  Mild first CMC OA.        Exam Ended: 03/17/25 10:30 AM Last Resulted: 03/17/25 10:46 AM     Interpreted in the office today     Self-referred